# Patient Record
Sex: FEMALE | Race: OTHER | HISPANIC OR LATINO
[De-identification: names, ages, dates, MRNs, and addresses within clinical notes are randomized per-mention and may not be internally consistent; named-entity substitution may affect disease eponyms.]

---

## 2023-10-25 ENCOUNTER — NON-APPOINTMENT (OUTPATIENT)
Age: 33
End: 2023-10-25

## 2023-11-02 ENCOUNTER — APPOINTMENT (OUTPATIENT)
Dept: ORTHOPEDIC SURGERY | Facility: CLINIC | Age: 33
End: 2023-11-02

## 2023-11-30 ENCOUNTER — APPOINTMENT (OUTPATIENT)
Dept: ORTHOPEDIC SURGERY | Facility: CLINIC | Age: 33
End: 2023-11-30

## 2024-02-20 ENCOUNTER — APPOINTMENT (OUTPATIENT)
Dept: ORTHOPEDIC SURGERY | Facility: CLINIC | Age: 34
End: 2024-02-20

## 2024-06-17 ENCOUNTER — APPOINTMENT (OUTPATIENT)
Dept: ORTHOPEDIC SURGERY | Facility: CLINIC | Age: 34
End: 2024-06-17

## 2024-08-07 PROBLEM — Z00.00 ENCOUNTER FOR PREVENTIVE HEALTH EXAMINATION: Status: ACTIVE | Noted: 2024-08-07

## 2024-10-03 ENCOUNTER — TRANSCRIPTION ENCOUNTER (OUTPATIENT)
Age: 34
End: 2024-10-03

## 2024-10-03 ENCOUNTER — APPOINTMENT (OUTPATIENT)
Dept: ORTHOPEDIC SURGERY | Facility: CLINIC | Age: 34
End: 2024-10-03
Payer: COMMERCIAL

## 2024-10-03 VITALS — HEIGHT: 68 IN | BODY MASS INDEX: 39.4 KG/M2 | WEIGHT: 260 LBS

## 2024-10-03 DIAGNOSIS — M17.0 BILATERAL PRIMARY OSTEOARTHRITIS OF KNEE: ICD-10-CM

## 2024-10-03 PROCEDURE — 99204 OFFICE O/P NEW MOD 45 MIN: CPT

## 2024-10-03 PROCEDURE — 73562 X-RAY EXAM OF KNEE 3: CPT | Mod: 50

## 2024-10-03 RX ORDER — NABUMETONE 500 MG/1
500 TABLET, FILM COATED ORAL
Qty: 60 | Refills: 1 | Status: ACTIVE | COMMUNITY
Start: 2024-10-03 | End: 1900-01-01

## 2024-10-03 NOTE — ASSESSMENT
[FreeTextEntry1] : Discussed at length with patient exam history and imaging and mild to moderate and mild patellofemoral arthritis.  At this time patient elects home exercise PT is anti-inflammatory if no improvement follow-up for injection she is aware that ultimately she may require total knee replacement

## 2024-10-03 NOTE — PHYSICAL EXAM
[de-identified] : Bilateral knee  Constitutional:  The patient is healthy-appearing and in no apparent distress.  Patient is overweight  Gait: The patient ambulates with a normal gait and no limp.  Cardiovascular System:  The capillary refill is less than 2 seconds.   Skin:  There are no skin abnormalities.  Right Knee:   Bony Palpation:  There is tenderness of the medial joint line.  There is no tenderness of the lateral joint line. There is tenderness of the medial femoral chondyle. There is no tenderness of the lateral femoral chondyle. There is no tenderness of the tibial tubercle. There is no tenderness of the superior patella. There is no tenderness of the inferior patella. There is tenderness of the medial patellar facet. There is tenderness of the lateral patellar facet.  Soft Tissue Palpation:  There is no tenderness of the medial retinaculum. There is no tenderness of the lateral retinaculum. There is no tenderness of the quadriceps tendon. There is no tenderness of the patella tendon. There is no tenderness of the ITB. There is no tenderness of the pes anserine.  Active Range of Motion:  The range of motion at the knee actively and passively is full.   Special Tests:  There is a negative Apley. There is a negative Steinmanns.  There is a negative Lachman and Anterior Drawer. There is a negative Posterior Drawer.   There is no varus or valgus laxity.  Strength:  There is 5/5 hip flexion and 5/5 knee flexion and extension.    Left Knee:   Bony Palpation:  There is tenderness of the medial joint line.  There is no tenderness of the lateral joint line. There is tenderness of the medial femoral chondyle. There is no tenderness of the lateral femoral chondyle. There is no tenderness of the tibial tubercle. There is no tenderness of the superior patella. There is no tenderness of the inferior patella. There is tenderness of the medial patellar facet. There is tenderness of the lateral patellar facet.  Soft Tissue Palpation:  There is no tenderness of the medial retinaculum. There is no tenderness of the lateral retinaculum. There is no tenderness of the quadriceps tendon. There is no tenderness of the patella tendon. There is no tenderness of the ITB. There is no tenderness of the pes anserine.  Active Range of Motion:  The range of motion at the knee actively and passively is full.   Special Tests:  There is a negative Apley. There is a negative Steinmanns.  There is a negative Lachman and Anterior Drawer. There is a negative Posterior Drawer.   There is no varus or valgus laxity.  Strength:  There is 5/5 hip flexion and 5/5 knee flexion and extension.    Psychiatric:  The patient demonstrates a normal mood and affect and is active and alert  [de-identified] : X-ray bilateral knee: There is mild to moderate medial compartment arthritis and mild patellofemoral with mild lateral patellar tilt

## 2024-10-03 NOTE — HISTORY OF PRESENT ILLNESS
[de-identified] :  Initial Visit: bilateral knee  Reason no falls or injury  Duration::   2 years but got worse this year Prior studies: x-ray order  Symptoms: aching pain/ sharp  Aggravating Fx: downstairs and up  Alleviating Fx:  heat  Pain: 8/10 Pain med: [facility] Hx: pre diabetic Surgery Hx: no Current MEd: Allergies: no

## 2025-01-16 ENCOUNTER — APPOINTMENT (OUTPATIENT)
Dept: ORTHOPEDIC SURGERY | Facility: CLINIC | Age: 35
End: 2025-01-16